# Patient Record
Sex: MALE | Race: ASIAN | NOT HISPANIC OR LATINO | Employment: UNEMPLOYED | URBAN - METROPOLITAN AREA
[De-identification: names, ages, dates, MRNs, and addresses within clinical notes are randomized per-mention and may not be internally consistent; named-entity substitution may affect disease eponyms.]

---
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
## 2021-06-02 VITALS — WEIGHT: 122 LBS | BODY MASS INDEX: 20.33 KG/M2 | HEIGHT: 65 IN

## 2021-06-22 NOTE — PROGRESS NOTES
[facility] Child Check    ASSESSMENT & PLAN  Gee Cabezas is a 18  y.o. 6  m.o. who has normal growth and normal development.    Diagnoses and all orders for this visit:    Annual physical exam - attempting to get records and vaccine history from previous clinic.   -     HM1(CBC and Differential)  -     Urinalysis  -     HM1 (CBC with Diff)  -     Lipid Cascade RANDOM    Substance abuse (H)    High risk heterosexual behavior - no condom usage  -     Chlamydia trachomatis & Neisseria gonorrhoeae, Amplified Detection  -     HIV Antigen/Antibody Screening [place]key  -     Syphilis Screen, Cascade    Tobacco use  - Counseling provided    School avoidance - skipping school. Will be in treatment for 90 days, then hopes to go back.    Substance abuse requiring inpatient treatment (H) - starting at [facility] next week. Facility requires some labs before intake. Also needs forms signed.  -     HM1(CBC and Differential)  -     Syphilis Screen, Cascade  -     Urinalysis  -     QTF-Mycobacterium tuberculosis by [facility]antiFERON-TB Gold Plus  -     HM1 (CBC with Diff)      Next physical in 1 year. Follow up sooner with updates on health    IMMUNIZATIONS/LABS  Mom reports our immunization record is not up to date. YVETTE signed to attempt to get vaccine records, but did not receive them from previous clinic before end of appointment.    REFERRALS  Dental:  Recommended that the patient establish care with a dentist.  Other:  No additional referrals were made at this time.    ANTICIPATORY GUIDANCE  I have reviewed age appropriate anticipatory guidance.    HEALTH HISTORY  Do you have any concerns that you'd like to discuss today?:   Establish care - attempting to get records from previous clinic. Mom thinks he's due for one vaccine, but believes the rest are up to date. Not sure which vaccine he needs.    Needs paperwork completed before starting at [facility] for marijuana abuse that has led to him  dropping out of school. This will be an inpatient program, lasting for 90 days. He needs labs done and form signed.    Currently he's not in school. He was skipping school a lot, which contributed to decision to have him enter a treatment program. He hopes to complete school after he's done with the program. He denies being anxious or depressed regarding situation.      Roomed by: Gladys HERNANDEZ LPN    Accompanied by Mother    Refills needed? No    Do you have any forms that need to be filled out? Yes Px assessment for [facility]       Do you have any significant health concerns in your family history?: No  Family History   Problem Relation Age of Onset     Hypertension Mother      Since your last visit, have there been any major changes in your family, such as a move, job change, separation, divorce, or death in the family?: No  Has a lack of transportation kept you from medical appointments?: No    Home  Who lives in your home?:  Mom, Dad, Brother Sister  Social History     Social History Narrative     Not on file     Do you have any concerns about losing your housing?: No  Is your housing safe and comfortable?: Yes  Do you have any trouble with sleep?:  No    Education  What school do you child attend?:  [employer]  What grade are you in?:  11th  How do you perform in school (grades, behavior, attention, homework?: Not well     Eating  Do you eat regular meals including fruits and vegetables?:  yes  What are you drinking (cow's milk, water, soda, juice, sports drinks, energy drinks, etc)?: cow's milk- whole, water, soda, juice, sports drinks and energy drinks  Have you been worried that you don't have enough food?: No  Do you have concerns about your body or appearance?:  No    Activities  Do you have friends?:  yes  Do you get at least one hour of physical activity per day?:  yes  How many hours a day are you in front of a screen other than for schoolwork (computer, TV, phone)?:  6  What do  "you do for exercise?:  run  Do you have interest/participate in community activities/volunteers/school sports?:  no    MENTAL HEALTH SCREENING  PHQ-9 Score:  2    VISION/HEARING  Vision: Completed. See Results  Hearing:  Completed. See Results     Hearing Screening    125Hz 250Hz 500Hz 1000Hz 2000Hz 3000Hz 4000Hz 6000Hz 8000Hz   Right ear:   20 20 20  20 20    Left ear:   20 20 20  20 20       Visual Acuity Screening    Right eye Left eye Both eyes   Without correction: 10/15 10/15 10/15   With correction:      Comments: Does wear glasses but has not been seen by a Doctor in a couple of years      TB Risk Assessment:  The patient and/or parent/guardian answer positive to:  patient and/or parent/guardian answer 'no' to all screening TB questions    Dyslipidemia Risk Screening  Have either of your parents or any of your grandparents had a stroke or heart attack before age 55?: No  Any parents with high cholesterol or currently taking medications to treat?: No     Dental  When was the last time you saw the dentist?: over 12 months ago   Parent/Guardian declines the fluoride varnish application today. Fluoride not applied today.    There is no problem list on file for this patient.      Drugs  Does the patient use tobacco/alcohol/drugs?:  yes    Safety  Does the patient have any safety concerns (peer or home)?:  no  Does the patient use safety belts, helmets and other safety equipment?:  yes    Sex  Have you ever had sex?:  Yes    MEASUREMENTS  Height:  5' 5\" (1.651 m)  Weight: 122 lb (55.3 kg)  BMI: Body mass index is 20.3 kg/m .  Blood Pressure: 104/76  Blood pressure percentiles are 15 % systolic and 84 % diastolic based on the 2017 AAP Clinical Practice Guideline. Blood pressure percentile targets: 90: 129/79, 95: 134/82, 95 + 12 mmH/94.    PHYSICAL EXAM  GEN: alert, well appearing  EYES: clear  R EAR: canal clear, TM pearly gray  L EAR: canal clear, TM pearly gray  NOSE: clear  OROPHARYNX: clear  NECK: " supple, no significant LAD  CVS: RRR, no murmur  LUNGS: clear, no increased work of breathing  ABD: soft, non-tender, non-distended  : patient declined  EXT: warm, well perfused, no swelling  MSK: nl muscle bulk, spine straight  NEURO: CN grossly intact, nl strength in UE and LE, nl gait, no dysmetria  SKIN: clear

## 2023-03-26 ENCOUNTER — HOSPITAL ENCOUNTER (EMERGENCY)
Facility: CLINIC | Age: 23
Discharge: HOME | End: 2023-03-26
Attending: EMERGENCY MEDICINE | Admitting: EMERGENCY MEDICINE

## 2023-03-26 ENCOUNTER — APPOINTMENT (OUTPATIENT)
Dept: CT IMAGING | Facility: CLINIC | Age: 23
End: 2023-03-26
Attending: EMERGENCY MEDICINE

## 2023-03-26 VITALS
SYSTOLIC BLOOD PRESSURE: 95 MMHG | OXYGEN SATURATION: 97 % | HEIGHT: 66 IN | BODY MASS INDEX: 24.11 KG/M2 | WEIGHT: 150 LBS | TEMPERATURE: 98.5 F | DIASTOLIC BLOOD PRESSURE: 46 MMHG | RESPIRATION RATE: 18 BRPM | HEART RATE: 57 BPM
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
## 2023-03-26 DIAGNOSIS — F10.920 ALCOHOLIC INTOXICATION WITHOUT COMPLICATION (H): ICD-10-CM

## 2023-03-26 DIAGNOSIS — S01.01XA SCALP LACERATION, INITIAL ENCOUNTER: ICD-10-CM

## 2023-03-26 LAB — ETHANOL SERPL-MCNC: 174 MG/DL

## 2023-03-26 PROCEDURE — 90471 IMMUNIZATION ADMIN: CPT | Performed by: EMERGENCY MEDICINE

## 2023-03-26 PROCEDURE — 82077 ASSAY SPEC XCP UR&BREATH IA: CPT | Performed by: EMERGENCY MEDICINE

## 2023-03-26 PROCEDURE — 72125 CT NECK SPINE W/O DYE: CPT

## 2023-03-26 PROCEDURE — 250N000011 HC RX IP 250 OP 636: Performed by: EMERGENCY MEDICINE

## 2023-03-26 PROCEDURE — 36415 COLL VENOUS BLD VENIPUNCTURE: CPT | Performed by: EMERGENCY MEDICINE

## 2023-03-26 PROCEDURE — 99285 EMERGENCY DEPT VISIT HI MDM: CPT | Mod: 25

## 2023-03-26 PROCEDURE — 70450 CT HEAD/BRAIN W/O DYE: CPT

## 2023-03-26 PROCEDURE — 90715 TDAP VACCINE 7 YRS/> IM: CPT | Performed by: EMERGENCY MEDICINE

## 2023-03-26 RX ADMIN — CLOSTRIDIUM TETANI TOXOID ANTIGEN (FORMALDEHYDE INACTIVATED), CORYNEBACTERIUM DIPHTHERIAE TOXOID ANTIGEN (FORMALDEHYDE INACTIVATED), BORDETELLA PERTUSSIS TOXOID ANTIGEN (GLUTARALDEHYDE INACTIVATED), BORDETELLA PERTUSSIS FILAMENTOUS HEMAGGLUTININ ANTIGEN (FORMALDEHYDE INACTIVATED), BORDETELLA PERTUSSIS PERTACTIN ANTIGEN, AND BORDETELLA PERTUSSIS FIMBRIAE 2/3 ANTIGEN 0.5 ML: 5; 2; 2.5; 5; 3; 5 INJECTION, SUSPENSION INTRAMUSCULAR at 02:39

## 2023-03-26 ASSESSMENT — ENCOUNTER SYMPTOMS
BACK PAIN: 0
HEADACHES: 1
WOUND: 1
NECK PAIN: 0

## 2023-03-26 NOTE — ED NOTES
"Pt responsive to voice and able to respond appropriately; c/o feeling \"a little lightheaded\" but denies any numbness or tingling to extremities.  "

## 2023-03-26 NOTE — Clinical Note
Gee Cabezas was seen and treated in our emergency department on 3/26/2023.  He may return to work on 03/28/2023.       If you have any questions or concerns, please don't hesitate to call.      Jasvir Robert MD

## 2023-03-26 NOTE — ED TRIAGE NOTES
Pt presents to ED with reports of slipping on the stairs tonight.  Pt reports falling down at least 10 feet of stairs tonight.  Pt has laceration to back of head.  Feeling dizzy.  Reports consuming some alcohol tonight while at a friends house.  Denies c-spine tenderness upon palpation.  Will apply c-collar as pt is intoxicated.     Triage Assessment       Row Name 03/26/23 0112       Triage Assessment (Adult)    Airway WDL WDL       Respiratory WDL    Respiratory WDL WDL       Skin Circulation/Temperature WDL    Skin Circulation/Temperature WDL WDL       Cardiac WDL    Cardiac WDL WDL       Peripheral/Neurovascular WDL    Peripheral Neurovascular WDL WDL       Cognitive/Neuro/Behavioral WDL    Cognitive/Neuro/Behavioral WDL WDL

## 2023-03-26 NOTE — ED PROVIDER NOTES
EMERGENCY DEPARTMENT ENCOUNTER      NAME: Gee Cabezas  AGE: 22 year old male  YOB: 2000  MRN: [id number]  EVALUATION DATE & TIME: No admission date for patient encounter.    PCP: Deonna Vela    ED PROVIDER: Jasvir Robert M.D.      Chief Complaint   Patient presents with     Head Injury     Fall         FINAL IMPRESSION:  1.  Acute fall.  2.  Acute cranial contusion.  3.  Acute 3 cm scalp laceration.  4.  Acute alcohol intoxication.    ED COURSE & MEDICAL DECISION MAKIN:15 AM.  I met with the patient to gather history and to perform my initial exam. We discussed plans for the ED course, including diagnostic testing and treatment. PPE worn: cloth mask.  Patient notes drinking heavy tonight and fell down 10 stairs.  His only complaint is a headache and on the crown of his head he has what looks like a laceration.  This will need to be cleaned up to the further delineate its extent.  No loss conscious.  No altered mental status confusion problems.  No neurological deficits or complaints.  Unknown when his last tetanus shot was.  2:35 AM.  Head CT and cervical spines are negative.  Cervical collar removed.  Blood alcohol 0.174.  Laceration repair as mentioned below.  Adacel was ordered.  Patient will be discharged home.  Instructions were given.    Pertinent Labs & Imaging studies reviewed. (See chart for details)  22 year old male presents to the Emergency Department for evaluation of a fall.    At the conclusion of the encounter I discussed the results of all of the tests and the disposition. The questions were answered. The patient or family acknowledged understanding and was agreeable with the care plan.       Medical Decision Making    History:    Supplemental history from: Mother.    External Record(s) reviewed: Both inpatient and outpatient computer records reviewed.    Work Up:    Chart documentation includes differential considered and any EKGs or imaging independently interpreted  by provider, where specified.  Differential diagnosis includes alcohol intoxication, cranial contusion, cranial laceration, bleed or fracture, etc.    In additional to work up documented, I considered the following work up: Documented in chart, if applicable.    External consultation:    Discussion of management with another provider: Documented in chart, if applicable    Complicating factors:    Care impacted by chronic illness: N/A    Care affected by social determinants of health: Alcohol Abuse and/or Recreational Drug Use    Disposition considerations: Pending negative evaluation, anticipate discharge home.          MEDICATIONS GIVEN IN THE EMERGENCY:  Medications   Tdap (tetanus-diphtheria-acell pertussis) (ADACEL) injection 0.5 mL (has no administration in time range)       NEW PRESCRIPTIONS STARTED AT TODAY'S ER VISIT  New Prescriptions    No medications on file          =================================================================    HPI    Patient information was obtained from: The patient and his mother.    Use of : N/A         Gee Cabezas is a 22 year old male with a pertinent history of a fall tonight who presents to this ED tonight for evaluation of cranial contusion, headache, scalp laceration.  Patient was drinking heavily tonight and fell down 10 stairs.  No loss conscious.  No complaints of neck pain as well complaint is headache and a scalp laceration.  Does not know when his last tetanus shot was.    He does not identify any waxing or waning symptoms otherwise, exacerbating or alleviating features,associated symptoms except as mentioned.  He otherwise denies any pain related complaints.    REVIEW OF SYSTEMS   Review of Systems   Musculoskeletal: Negative for back pain and neck pain.   Skin: Positive for wound (head laceration).        Positive for contusions on head   Neurological: Positive for headaches. Negative for syncope.   All other systems reviewed and are negative.   patient  "complaining of a scalp laceration and headache.  No neck or back pain, no loss conscious, no other complaints on review.    PAST MEDICAL HISTORY:  No past medical history on file.    PAST SURGICAL HISTORY:  No past surgical history on file.        CURRENT MEDICATIONS:    No current outpatient medications on file.      ALLERGIES:  No Known Allergies    FAMILY HISTORY:  No family history on file.    SOCIAL HISTORY:   Social History     Socioeconomic History     Marital status: Single   Tobacco Use     Smoking status: Unknown     Smokeless tobacco: Never   Alcohol use.    VITALS:  BP 95/46   Pulse 61   Temp 98.5  F (36.9  C) (Temporal)   Resp 18   Ht 1.676 m (5' 6\")   Wt 68 kg (150 lb)   SpO2 100%   BMI 24.21 kg/m      PHYSICAL EXAM    Vital Signs:  BP 95/46   Pulse 61   Temp 98.5  F (36.9  C) (Temporal)   Resp 18   Ht 1.676 m (5' 6\")   Wt 68 kg (150 lb)   SpO2 100%   BMI 24.21 kg/m    General:  On entering the room he is in no apparent distress.    Neck:  Neck supple with full range of motion and nontender.    Back:  Back and spine are nontender.  No costovertebral angle tenderness.    HEENT:  Oropharynx clear with moist mucous membranes.  HEENT unremarkable.  Crown of the head scalp laceration with some tenderness.  No palpable fracture, crepitance or step-off.  Pulmonary:  Chest clear to auscultation without rhonchi rales or wheezing.    Cardiovascular:  Cardiac regular rate and rhythm without murmurs rubs or gallops.    Abdomen:  Abdomen soft nontender.  There is no rebound or guarding.    Muskuloskeletal:  he moves all 4 without any difficulty and has normal neurovascular exams.  Extremities without clubbing, cyanosis, or edema.  Legs and calves are nontender.    Neuro:  he is alert and oriented ×3 and moves all extremities symmetrically.  Strength 5/5 in all 4 extremities.  Sensation intact all extremities.  Cranial nerves II through XII intact equal bilaterally.  Pupils are corrected to light.  " Extraocular movements intact.  Psych:  Normal affect.    Skin:  Unremarkable and warm and dry.       LAB:  All pertinent labs reviewed and interpreted.  Labs Ordered and Resulted from Time of ED Arrival to Time of ED Departure   ETHYL ALCOHOL LEVEL - Abnormal       Result Value    Alcohol, Blood 174 (*)        RADIOLOGY:  Reviewed all pertinent imaging. Please see official radiology report.  CT Cervical Spine w/o Contrast   Final Result    IMPRESSION:   1.  No acute fracture.            CT Head w/o Contrast   Final Result   IMPRESSION:   1.  No acute intracranial process.                    EKG:          PROCEDURES:   Laceration was numbed with 1% plain lidocaine.  There was wound irrigation and cleaning.  A few hairs were debrided away from the cut and the cut was closed with 11 staples.  Patient tolerated the procedure well.      I, Greer Trell Garzaong, am serving as a scribe to document services personally performed by Dr. Robert based on my observation and the provider's statements to me. I, Jasvir Robert MD attest that Greer Oneal is acting in a scribe capacity, has observed my performance of the services and has documented them in accordance with my direction.    Jasvir Robert M.D.  Emergency Medicine  [facility] EMERGENCY ROOM  [address] [facility] [id number] [phone]  Dept: [phone]       Jasvir Robert MD  03/26/23 4925

## 2023-03-26 NOTE — Clinical Note
Gee Cabezas was seen and treated in our emergency department on 3/26/2023.  He may return to work on 03/27/2023.       If you have any questions or concerns, please don't hesitate to call.      Jasvir Robert MD

## 2023-03-26 NOTE — DISCHARGE INSTRUCTIONS
Wash hair once then keep clean and dry for 2 to 3 days.  Staple removal at his doctor's office in 7 to 10 days.  Tylenol or ibuprofen if headache.  Cervical spine CAT scan and head CAT scan were negative.  Blood alcohol level was 0.174.  The legal limit is 0.100.

## 2023-04-05 ENCOUNTER — TELEPHONE (OUTPATIENT)
Dept: PEDIATRICS | Facility: CLINIC | Age: 23
End: 2023-04-05

## 2023-04-05 ENCOUNTER — ALLIED HEALTH/NURSE VISIT (OUTPATIENT)
Dept: FAMILY MEDICINE | Facility: CLINIC | Age: 23
End: 2023-04-05

## 2023-04-05 DIAGNOSIS — Z48.02 REMOVAL OF STAPLES: Primary | ICD-10-CM

## 2023-04-05 PROCEDURE — 99207 PR NO CHARGE NURSE ONLY: CPT

## 2023-04-05 NOTE — LETTER
April 5, 2023      Gee Cabezas  [address] [place] [place] [address]        To Whom It May Concern:    Gee Cabezas  was seen on 4/5/2023.  Please excuse him  until *** due to {WORK EXCUSE:561955}.        Sincerely,        Nurse

## 2023-04-05 NOTE — LETTER
To whom it may concern,       Gee was seen at [facility] on 4/5/23 to have staples removed.           [facility]

## 2023-04-05 NOTE — TELEPHONE ENCOUNTER
Forms/Letter Request    Type of form/letter: Work    Have you been seen for this request: Yes RN visit    Do we have the form/letter: Yes: need letter stating pt was seen in clinic on 4/5/2023    When is form/letter needed by: ASAP    How would you like the form/letter returned: Libra    Patient Notified form requests are processed in 3-5 business days:No    Okay to leave a detailed message?: Yes at Cell number on file:    Telephone Information:   Mobile [phone]

## 2023-04-05 NOTE — PROGRESS NOTES
Gee Cabezas presents to the clinic today for removal of sutures and staples.  The patient has had the staples in place for 10 days.  There has been no history of infection or drainage.  11 staples are seen located on the top of head.  The wound is healing well with no signs of infection.  Tetanus status is up to date.   All sutures and staples were easily removed today.  Routine wound care discussed.  The patient will follow up as needed.      Janel Johnson RN

## 2023-04-16 ENCOUNTER — HEALTH MAINTENANCE LETTER (OUTPATIENT)
Age: 23
End: 2023-04-16

## 2024-06-22 ENCOUNTER — HEALTH MAINTENANCE LETTER (OUTPATIENT)
Age: 24
End: 2024-06-22

## 2025-07-12 ENCOUNTER — HEALTH MAINTENANCE LETTER (OUTPATIENT)
Age: 25
End: 2025-07-12